# Patient Record
Sex: MALE | Race: WHITE | NOT HISPANIC OR LATINO | Employment: OTHER | URBAN - METROPOLITAN AREA
[De-identification: names, ages, dates, MRNs, and addresses within clinical notes are randomized per-mention and may not be internally consistent; named-entity substitution may affect disease eponyms.]

---

## 2020-08-03 ENCOUNTER — VIRTUAL VISIT (OUTPATIENT)
Dept: FAMILY MEDICINE | Facility: CLINIC | Age: 60
End: 2020-08-03
Payer: COMMERCIAL

## 2020-08-03 ENCOUNTER — TELEPHONE (OUTPATIENT)
Dept: FAMILY MEDICINE | Facility: CLINIC | Age: 60
End: 2020-08-03

## 2020-08-03 DIAGNOSIS — E03.9 HYPOTHYROIDISM, UNSPECIFIED TYPE: ICD-10-CM

## 2020-08-03 DIAGNOSIS — M10.9 ACUTE GOUT INVOLVING TOE OF LEFT FOOT, UNSPECIFIED CAUSE: Primary | ICD-10-CM

## 2020-08-03 PROCEDURE — 99203 OFFICE O/P NEW LOW 30 MIN: CPT | Mod: 95 | Performed by: NURSE PRACTITIONER

## 2020-08-03 RX ORDER — PREDNISONE 20 MG/1
TABLET ORAL
Qty: 15 TABLET | Refills: 0 | Status: SHIPPED | OUTPATIENT
Start: 2020-08-03 | End: 2020-08-13

## 2020-08-03 RX ORDER — ATORVASTATIN CALCIUM 20 MG/1
TABLET, FILM COATED ORAL DAILY
COMMUNITY
Start: 2020-06-22 | End: 2022-09-15

## 2020-08-03 RX ORDER — HYDROCHLOROTHIAZIDE 25 MG/1
25 TABLET ORAL DAILY
COMMUNITY
Start: 2020-01-07 | End: 2022-09-15

## 2020-08-03 RX ORDER — LOSARTAN POTASSIUM 100 MG/1
50 TABLET ORAL 2 TIMES DAILY
COMMUNITY
Start: 2020-07-07 | End: 2022-09-15

## 2020-08-03 RX ORDER — ALBUTEROL SULFATE 90 UG/1
AEROSOL, METERED RESPIRATORY (INHALATION)
COMMUNITY
Start: 2020-05-29 | End: 2022-09-15

## 2020-08-03 RX ORDER — LEVOTHYROXINE SODIUM 75 UG/1
75 TABLET ORAL
COMMUNITY
Start: 2020-01-07 | End: 2020-08-03

## 2020-08-03 RX ORDER — DEXAMETHASONE 4 MG/1
TABLET ORAL
COMMUNITY
Start: 2020-04-04 | End: 2022-09-15

## 2020-08-03 RX ORDER — LEVOTHYROXINE SODIUM 75 UG/1
75 TABLET ORAL DAILY
Qty: 90 TABLET | Refills: 0 | Status: SHIPPED | OUTPATIENT
Start: 2020-08-03 | End: 2020-11-24

## 2020-08-03 RX ORDER — FLUTICASONE PROPIONATE 50 MCG
SPRAY, SUSPENSION (ML) NASAL
COMMUNITY
Start: 2020-04-21

## 2020-08-03 NOTE — TELEPHONE ENCOUNTER
Pt was established with [facility] but insurance no longer covers them.  Pt thinks he is having a gout flare up and requesting possible meds.  Here is a new pt to [facility] and does not want to come into the clinic due to covid.    Scheduled telephone visit with Alex today at 2:20 pm.    Smita BRENNAN RN  EP Triage

## 2020-08-03 NOTE — PROGRESS NOTES
"Getachew Nicole is a 60 year old male who is being evaluated via a billable telephone visit.      The patient has been notified of following:     \"This telephone visit will be conducted via a call between you and your physician/provider. We have found that certain health care needs can be provided without the need for a physical exam.  This service lets us provide the care you need with a short phone conversation.  If a prescription is necessary we can send it directly to your pharmacy.  If lab work is needed we can place an order for that and you can then stop by our lab to have the test done at a later time.    Telephone visits are billed at different rates depending on your insurance coverage. During this emergency period, for some insurers they may be billed the same as an in-person visit.  Please reach out to your insurance provider with any questions.    If during the course of the call the physician/provider feels a telephone visit is not appropriate, you will not be charged for this service.\"    Patient has given verbal consent for Telephone visit?  Yes    What phone number would you like to be contacted at? [phone]    How would you like to obtain your AVS? Mail a copy    Subjective     Getachew Nicole is a 60 year old male who presents via phone visit today for the following health issues:      Gout/ single inflamed joint   Onset: yesterday     Description:   Location: big toe and foot - left  Joint Swelling: YES  Redness: YES  Pain: YES    Intensity: mild to moderate     Progression of Symptoms:  worsening    Accompanying Signs & Symptoms:  Fevers: no     History:   Trauma to the area: no   Previous history of gout: YES   Recent illness:  no     Precipitating factors:   Diet-rich in purine: no  Alcohol use: YES  Diuretic use: YES    Alleviating factors:  Elevation     Therapies Tried and outcome: historically prednisone    HPI: Getachew calls today with the concern of possible gout flare. He has had " one gout flare a couple a years ago (uric acid was checked and found to be elevated). Prednisone was used successfully during that episode. He notes that his symptoms today mimic exactly those that he was suffering from at that time. Specifically, he reports that his left big toe began to hurt yesterday. Soon after, it became red, warm, swollen, and tender. No fever, chills. No obvious skin compromise to causes suspicion for skin infection. No nail problems. He does have chronic kidney disease, so he doesn't use NSAIDs.     Patient Active Problem List   Diagnosis     Hypothyroidism     History reviewed. No pertinent surgical history.    Social History     Tobacco Use     Smoking status: Never Smoker     Smokeless tobacco: Never Used   Substance Use Topics     Alcohol use: Not on file     History reviewed. No pertinent family history.        Reviewed and updated as needed this visit by Provider  Tobacco  Allergies  Meds  Problems  Med Hx  Surg Hx  Fam Hx         Review of Systems   Constitutional, musculoskeletal, neuro, skin systems are negative, except as otherwise noted.       Objective   Reported vitals:  There were no vitals taken for this visit.   healthy, alert and no distress  PSYCH: Alert and oriented times 3; coherent speech, normal   rate and volume, able to articulate logical thoughts, able   to abstract reason, no tangential thoughts, no hallucinations   or delusions  His affect is normal  RESP: No cough, no audible wheezing, able to talk in full sentences  Remainder of exam unable to be completed due to telephone visits    Diagnostic Test Results:  Labs reviewed in Epic        Assessment/Plan:    1. Acute gout involving toe of left foot, unspecified cause  Comment: Would like to check uric acid and consider urate-lowering therapy, but he declines coming into the clinic at this time out of fear of janis COVID-19. Will try previously-successful prednisone course with the plan to have him come  for full fasting physical in the coming months at which time we can check all fasting labs and uric acid. He agrees with this approach. No red flags. Reinforced his need to minimize / avoid NSAID use.   - predniSONE (DELTASONE) 20 MG tablet; Take 40 mg (2 tablets) a day for 5 days followed by 20 mg (1 tablet) a day for 5 days.  Dispense: 15 tablet; Refill: 0    2. Hypothyroidism, unspecified type  Comment: Needs short fill to get him through until he can come for physical and labs.   - levothyroxine (SYNTHROID/LEVOTHROID) 75 MCG tablet; Take 1 tablet (75 mcg) by mouth daily  Dispense: 90 tablet; Refill: 0    Return in about 1 week (around 8/10/2020) for persistent or worsening symptoms.      Phone call duration:  16 minutes    Esa Ospina NP

## 2020-08-03 NOTE — TELEPHONE ENCOUNTER
General Call:   Who is calling:  Pt   Reason for Call:  requesting  the Nurse to call him back for advice, pt never establish care with us here, but he wants one of our nurse to call him for his left sore feet.  Pt advised to establish care first to get advice from our nurse but pt is refused and wants me to send the message  What are your questions or concerns:  NA  Date of last appointment with provider: NEW  Okay to leave a detailed message:Yes at Cell number on file:    Telephone Information:   Mobile [phone]

## 2020-08-13 ENCOUNTER — TELEPHONE (OUTPATIENT)
Dept: FAMILY MEDICINE | Facility: CLINIC | Age: 60
End: 2020-08-13

## 2020-08-13 DIAGNOSIS — M10.9 ACUTE GOUT INVOLVING TOE OF LEFT FOOT, UNSPECIFIED CAUSE: ICD-10-CM

## 2020-08-13 RX ORDER — PREDNISONE 20 MG/1
40 TABLET ORAL DAILY
Qty: 10 TABLET | Refills: 0 | Status: SHIPPED | OUTPATIENT
Start: 2020-08-13 | End: 2021-03-09

## 2020-08-13 RX ORDER — PREDNISONE 20 MG/1
40 TABLET ORAL DAILY
Qty: 10 TABLET | Refills: 0 | Status: SHIPPED | OUTPATIENT
Start: 2020-08-13 | End: 2020-08-13

## 2020-08-13 NOTE — TELEPHONE ENCOUNTER
Non detailed message left for pt to return call to clinic and ask to speak with a triage nurse.    Need to know if pt requested medication or auto refill?    Smita BRENNAN RN  EP Triage

## 2020-08-13 NOTE — TELEPHONE ENCOUNTER
Patient states that he is not pain free from gout yet. Pain decreased from 8.5 down to current level of 2.5. Patient is thinking 1 more prescription might get him to zero pain level. Patient finished prednisone prescription yesterday.    Please advise. Triage to call the patient back. OK to leave detailed message.   Abbi Scherer RN

## 2020-08-13 NOTE — TELEPHONE ENCOUNTER
[facility] calling from [facility] [place]. Informed that rx on current medication list for prednisone is 20 mg, 2 tablets by mouth daily for 5 days., # 10 tablets. Abbi Scherer RN

## 2020-08-13 NOTE — TELEPHONE ENCOUNTER
Please let him know that it isn't probably a good idea to repeat the whole ten day course starting at 60 mg (adrenal suppression, other side effects, etc.). I will order 5 days at 40 mg. Let's try that. Thanks.

## 2020-11-24 DIAGNOSIS — E03.9 HYPOTHYROIDISM, UNSPECIFIED TYPE: ICD-10-CM

## 2020-11-24 RX ORDER — LEVOTHYROXINE SODIUM 75 UG/1
75 TABLET ORAL DAILY
Qty: 30 TABLET | Refills: 0 | Status: SHIPPED | OUTPATIENT
Start: 2020-11-24 | End: 2022-09-15

## 2020-11-24 NOTE — LETTER
December 3, 2020      Getachew Nicole  [address] [place] [address]        Dear Getachew,    I care about your health and have reviewed your health plan. I have reviewed your medical conditions, medication list, and lab results and am making recommendations based on this review, to better manage your health.    You are in particular need of attention regarding:  -Wellness (Physical) Visit     I am recommending that you:  -Schedule an appointment for a fasting physical    Here is a list of Health Maintenance topics that are due now or due soon:  Health Maintenance Due   Topic Date Due     Preventive Care Visit  1960     Discuss Advance Care Planning  1960     Colorectal Cancer Screening  03/12/1970     HIV Screening  03/12/1975     Hepatitis C Screening  03/12/1978     Cholesterol Lab  03/12/1995     PHQ-2  01/01/2020     Flu Vaccine (1) 09/01/2020     Zoster (Shingles) Vaccine (2 of 2) 03/03/2020       Please call us at [phone] (or use [email or web address]) to address the above recommendations.     Thank you for trusting [facility] and we appreciate the opportunity to serve you.  We look forward to supporting your healthcare needs in the future.    Healthy Regards,    Esa Ospina, APRN, CNP

## 2020-12-03 NOTE — TELEPHONE ENCOUNTER
Letter sent.    .Genesis QUIROZ    [facility] [place]

## 2021-03-08 DIAGNOSIS — M10.9 ACUTE GOUT INVOLVING TOE OF LEFT FOOT, UNSPECIFIED CAUSE: ICD-10-CM

## 2021-03-08 NOTE — LETTER
March 15, 2021      Getachew Nicole  [address]  [place] [address]        We have received a refill request for Prednisone, this was to be a one time prescription.  It has been refilled this time, but for any further refills of this medication you will need to have an office visit.  Please follow up in clinic when able. You are also due for thyroid and blood pressure check           Sincerely,      Rachana Hines [facility]

## 2021-03-09 RX ORDER — PREDNISONE 20 MG/1
40 TABLET ORAL DAILY
Qty: 10 TABLET | Refills: 0 | OUTPATIENT
Start: 2021-03-09

## 2021-03-09 RX ORDER — PREDNISONE 20 MG/1
40 TABLET ORAL DAILY
Qty: 10 TABLET | Refills: 0 | Status: SHIPPED | OUTPATIENT
Start: 2021-03-09

## 2021-03-09 NOTE — TELEPHONE ENCOUNTER
Please see if he requested this or if it is on auto-refill at his pharmacy. If he is requesting it for a presumed gout flare, will need visit (virtual or in-person). Thanks.

## 2021-03-09 NOTE — TELEPHONE ENCOUNTER
States he has no insurance right now and is hoping to get a precprition with out a visit.  Last virtual visit costed him over $500.  States he has had a gout flare since last week. Advised I would forward the message to provider and follow up.  Clem MCKEON, CMA

## 2021-03-09 NOTE — TELEPHONE ENCOUNTER
Failed protocol.  please route to  team if patient needs an appointment     Sally SIMONRN BSN  [facility]  [phone]

## 2021-03-09 NOTE — TELEPHONE ENCOUNTER
I sent this as a one-time Rx. Will be unable to do this in the future without clinical encounter. Please follow up in clinic when able. Looks like he is due for thyroid and BP checks, as well.     Thanks,    Alex

## 2021-03-18 DIAGNOSIS — E03.9 HYPOTHYROIDISM, UNSPECIFIED TYPE: ICD-10-CM

## 2021-03-18 RX ORDER — LEVOTHYROXINE SODIUM 75 UG/1
75 TABLET ORAL DAILY
Qty: 30 TABLET | Refills: 0 | OUTPATIENT
Start: 2021-03-18

## 2021-03-18 NOTE — TELEPHONE ENCOUNTER
Letter was mailed to pt 3/15/21. Pharmacy advised to add note for pt that appointment is needed. Clem MCKEON CMA

## 2021-03-18 NOTE — TELEPHONE ENCOUNTER
"Failed protocol.  please route to  team if patient needs an appointment     Sally SIMONRN BSN  [facility]  [phone]    Requested Prescriptions   Pending Prescriptions Disp Refills     levothyroxine (SYNTHROID/LEVOTHROID) 75 MCG tablet 30 tablet 0     Sig: Take 1 tablet (75 mcg) by mouth daily       Thyroid Protocol Failed - 3/18/2021  9:59 AM        Failed - Normal TSH on file in past 12 months     No lab results found.           Passed - Patient is 12 years or older        Passed - Recent (12 mo) or future (30 days) visit within the authorizing provider's specialty     Patient has had an office visit with the authorizing provider or a provider within the authorizing providers department within the previous 12 mos or has a future within next 30 days. See \"Patient Info\" tab in inbasket, or \"Choose Columns\" in Meds & Orders section of the refill encounter.              Passed - Medication is active on med list             "

## 2021-04-14 ENCOUNTER — TELEPHONE (OUTPATIENT)
Dept: FAMILY MEDICINE | Facility: CLINIC | Age: 61
End: 2021-04-14

## 2021-04-14 NOTE — LETTER
[facility]  [address]  [place] [address]  Phone: [phone]      Getachew Nicole  [address]  [place] [address]      Greetings Getachew,    I am going through our patient lists here at the [facility] to find potential gaps in our patients' recommended preventive health care plans. Your name appeared on a list of patients who may be overdue for colorectal cancer screening. The most common / popular options for screening include: Colonoscopy (performed every 10 years if normal) or FIT test (a stool test performed annually). Please consider reaching out to us, so we can order one of these studies to make sure you can participate in this lifesaving preventive health screening. It is possible that you may have received this screening at an outside facility / health system, which is great. If that is the case, please send us the following information, so we can update your records and our patient lists:     1. Approximate date (month and year)  2. Health system / clinic / facility  3. General results (normal verus abnormal)    Thank you very much for your time and attention.    Have a great day,    ISAIAS Carlin, CNP

## 2021-08-25 ENCOUNTER — LAB REQUISITION (OUTPATIENT)
Dept: LAB | Facility: CLINIC | Age: 61
End: 2021-08-25
Payer: COMMERCIAL

## 2021-08-25 DIAGNOSIS — E03.9 HYPOTHYROIDISM, UNSPECIFIED: ICD-10-CM

## 2021-08-25 DIAGNOSIS — N18.31 CHRONIC KIDNEY DISEASE, STAGE 3A (H): ICD-10-CM

## 2021-08-25 LAB
CREAT UR-MCNC: 93 MG/DL
PROT UR-MCNC: 0.19 G/L
PROT/CREAT 24H UR: 0.2 G/G CR (ref 0–0.2)
PTH-INTACT SERPL-MCNC: 104 PG/ML (ref 18–80)
TOTAL PROTEIN SERUM FOR ELP: 7.4 G/DL (ref 6.8–8.8)
TSH SERPL DL<=0.005 MIU/L-ACNC: 3.1 MU/L (ref 0.4–4)

## 2021-08-25 PROCEDURE — 84165 PROTEIN E-PHORESIS SERUM: CPT | Mod: ORL | Performed by: PATHOLOGY

## 2021-08-25 PROCEDURE — 84165 PROTEIN E-PHORESIS SERUM: CPT | Mod: 26 | Performed by: PATHOLOGY

## 2021-08-25 PROCEDURE — 84166 PROTEIN E-PHORESIS/URINE/CSF: CPT | Mod: TC,ORL | Performed by: PATHOLOGY

## 2021-08-25 PROCEDURE — 84443 ASSAY THYROID STIM HORMONE: CPT | Mod: ORL | Performed by: INTERNAL MEDICINE

## 2021-08-25 PROCEDURE — 84155 ASSAY OF PROTEIN SERUM: CPT | Mod: ORL | Performed by: INTERNAL MEDICINE

## 2021-08-25 PROCEDURE — 84166 PROTEIN E-PHORESIS/URINE/CSF: CPT | Mod: 26 | Performed by: PATHOLOGY

## 2021-08-25 PROCEDURE — 83970 ASSAY OF PARATHORMONE: CPT | Mod: ORL | Performed by: INTERNAL MEDICINE

## 2021-08-25 PROCEDURE — 84156 ASSAY OF PROTEIN URINE: CPT | Mod: ORL | Performed by: INTERNAL MEDICINE

## 2021-08-26 LAB
ALBUMIN MFR UR ELPH: 49.1 %
ALPHA1 GLOB MFR UR ELPH: 9.1 %
ALPHA2 GLOB MFR UR ELPH: 9.6 %
B-GLOBULIN MFR UR ELPH: 13.2 %
GAMMA GLOB MFR UR ELPH: 19 %
M PROTEIN MFR UR ELPH: 0 %
PROT PATTERN UR ELPH-IMP: ABNORMAL

## 2021-08-27 LAB
ALBUMIN SERPL ELPH-MCNC: 4.1 G/DL (ref 3.7–5.1)
ALPHA1 GLOB SERPL ELPH-MCNC: 0.3 G/DL (ref 0.2–0.4)
ALPHA2 GLOB SERPL ELPH-MCNC: 1 G/DL (ref 0.5–0.9)
B-GLOBULIN SERPL ELPH-MCNC: 0.7 G/DL (ref 0.6–1)
GAMMA GLOB SERPL ELPH-MCNC: 1.2 G/DL (ref 0.7–1.6)
M PROTEIN SERPL ELPH-MCNC: 0 G/DL
PROT PATTERN SERPL ELPH-IMP: ABNORMAL

## 2021-12-22 ENCOUNTER — TELEPHONE (OUTPATIENT)
Dept: FAMILY MEDICINE | Facility: CLINIC | Age: 61
End: 2021-12-22
Payer: COMMERCIAL

## 2021-12-22 NOTE — TELEPHONE ENCOUNTER
Patient called asking if [facility] is testing for the [employer] Variant. Informed the patient that at this moment in time [facility] is not. Advised the patient to check with [facility] web site for the most up to date information.    Patient states that he has been having a runny nose for 4-6 weeks. Advised the patient to do an E visit.     Abbi Scherer RN

## 2022-07-26 ENCOUNTER — TRANSFERRED RECORDS (OUTPATIENT)
Dept: HEALTH INFORMATION MANAGEMENT | Facility: CLINIC | Age: 62
End: 2022-07-26

## 2022-08-09 ENCOUNTER — TELEPHONE (OUTPATIENT)
Dept: CARDIOLOGY | Facility: CLINIC | Age: 62
End: 2022-08-09

## 2022-08-09 DIAGNOSIS — R00.2 PALPITATIONS: Primary | ICD-10-CM

## 2022-08-09 NOTE — TELEPHONE ENCOUNTER
----- Message from Solange Mercado RN sent at 8/9/2022  2:33 PM CDT -----  Regarding: Not urgent- please request records from [facility] 9/7 for abnormal HR; ED visit/OV? Labs? Testing?

## 2022-08-09 NOTE — TELEPHONE ENCOUNTER
[facility] called for records.  Only seen there once in ED on 7-26-22 for A Flutter,   Clinic will fax ED note, EKG tracing, and labs.   Records will be faxed.     [facility] records ph# [phone]  Fax # [phone] or [phone].

## 2022-08-18 NOTE — TELEPHONE ENCOUNTER
Routing refill request to provider for review/approval because:  Labs not current:  NO TSH on file    Smita BRENNAN RN  EP Triage           None known

## 2022-08-27 ENCOUNTER — NURSE TRIAGE (OUTPATIENT)
Dept: NURSING | Facility: CLINIC | Age: 62
End: 2022-08-27
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
## 2022-09-02 NOTE — TELEPHONE ENCOUNTER
Called [facility] medical records again for ED visit notes, said the sent it on 8/16 but discussed that we did not receive it. They will re-send it before the end of the day.

## 2022-09-15 ENCOUNTER — OFFICE VISIT (OUTPATIENT)
Dept: CARDIOLOGY | Facility: CLINIC | Age: 62
End: 2022-09-15
Payer: COMMERCIAL

## 2022-09-15 VITALS
HEIGHT: 70 IN | HEART RATE: 74 BPM | SYSTOLIC BLOOD PRESSURE: 149 MMHG | BODY MASS INDEX: 25.09 KG/M2 | DIASTOLIC BLOOD PRESSURE: 63 MMHG | WEIGHT: 175.3 LBS

## 2022-09-15 DIAGNOSIS — I10 PRIMARY HYPERTENSION: ICD-10-CM

## 2022-09-15 DIAGNOSIS — N18.30 STAGE 3 CHRONIC KIDNEY DISEASE, UNSPECIFIED WHETHER STAGE 3A OR 3B CKD (H): ICD-10-CM

## 2022-09-15 DIAGNOSIS — E03.9 HYPOTHYROIDISM, UNSPECIFIED TYPE: ICD-10-CM

## 2022-09-15 DIAGNOSIS — I48.92 ATRIAL FLUTTER, UNSPECIFIED TYPE (H): ICD-10-CM

## 2022-09-15 DIAGNOSIS — R00.2 PALPITATIONS: Primary | ICD-10-CM

## 2022-09-15 PROCEDURE — 93000 ELECTROCARDIOGRAM COMPLETE: CPT | Performed by: INTERNAL MEDICINE

## 2022-09-15 PROCEDURE — 99204 OFFICE O/P NEW MOD 45 MIN: CPT | Performed by: INTERNAL MEDICINE

## 2022-09-15 RX ORDER — MONTELUKAST SODIUM 10 MG/1
10 TABLET ORAL AT BEDTIME
Qty: 90 TABLET | Refills: 4 | Status: SHIPPED | OUTPATIENT
Start: 2022-09-15

## 2022-09-15 RX ORDER — LOSARTAN POTASSIUM 100 MG/1
50 TABLET ORAL 2 TIMES DAILY
Qty: 90 TABLET | Refills: 4 | Status: SHIPPED | OUTPATIENT
Start: 2022-09-15

## 2022-09-15 RX ORDER — ALLOPURINOL 100 MG/1
100 TABLET ORAL DAILY
Qty: 90 TABLET | Refills: 4 | Status: SHIPPED | OUTPATIENT
Start: 2022-09-15

## 2022-09-15 RX ORDER — METOPROLOL SUCCINATE 100 MG/1
100 TABLET, EXTENDED RELEASE ORAL DAILY
Qty: 90 TABLET | Refills: 4 | Status: SHIPPED | OUTPATIENT
Start: 2022-09-15

## 2022-09-15 RX ORDER — METOPROLOL SUCCINATE 100 MG/1
100 TABLET, EXTENDED RELEASE ORAL DAILY
COMMUNITY
Start: 2022-07-27 | End: 2022-09-15

## 2022-09-15 RX ORDER — MONTELUKAST SODIUM 10 MG/1
10 TABLET ORAL AT BEDTIME
COMMUNITY
End: 2022-09-15

## 2022-09-15 RX ORDER — ATORVASTATIN CALCIUM 20 MG/1
20 TABLET, FILM COATED ORAL DAILY
Qty: 90 TABLET | Refills: 4 | Status: SHIPPED | OUTPATIENT
Start: 2022-09-15

## 2022-09-15 RX ORDER — ALBUTEROL SULFATE 90 UG/1
AEROSOL, METERED RESPIRATORY (INHALATION)
Qty: 18 G | Refills: 4 | Status: SHIPPED | OUTPATIENT
Start: 2022-09-15

## 2022-09-15 RX ORDER — DEXAMETHASONE 4 MG/1
TABLET ORAL
Qty: 0.11 G | Refills: 4 | Status: SHIPPED | OUTPATIENT
Start: 2022-09-15

## 2022-09-15 RX ORDER — ALLOPURINOL 100 MG/1
100 TABLET ORAL DAILY
COMMUNITY
End: 2022-09-15

## 2022-09-15 RX ORDER — HYDROCHLOROTHIAZIDE 25 MG/1
25 TABLET ORAL DAILY
Qty: 90 TABLET | Refills: 4 | Status: SHIPPED | OUTPATIENT
Start: 2022-09-15

## 2022-09-15 RX ORDER — LEVOTHYROXINE SODIUM 75 UG/1
75 TABLET ORAL DAILY
Qty: 90 TABLET | Refills: 4 | Status: SHIPPED | OUTPATIENT
Start: 2022-09-15

## 2022-09-15 NOTE — PROGRESS NOTES
Service Date: 09/15/2022    REFERRING PROVIDER:  Not listed.    HISTORY OF PRESENT ILLNESS:  Mr. Nicole is a pleasant 62-year-old male who was seen in [facility] at the end of July for an episode of syncope.  He was at [employer] and had been sitting in a hot tub.  He went to go get in the pool.  He felt a little lightheaded, so he sat down for a minute.  When he went to get up, he fainted, hit his head and had a head laceration.  He was taken to the Emergency Department, and he was found to be in a supraventricular tachycardia, heart rate initially in the 250s.  He converted into an atrial flutter and then ultimately was cardioverted in the ED.  He was placed on Eliquis in addition to his antihypertensives, which include metoprolol 100 mg daily.  He did not start the Eliquis but talked to a friend who is a physician, who advised him to take 2 baby aspirin every night.  He was concerned that he could not take the Eliquis with his kidney dysfunction.  He told me that he had been off his metoprolol for several days because he ran out prior to this event.  He has never had any rhythm disorders.  He denies palpitations.  He swims every day at [employer] and has no exercise intolerance.  Denies any chest pain or difficulty breathing.  We did perform an electrocardiogram in office today.  This demonstrates a sinus rhythm with a slight interventricular conduction delay.  He has ST segment depressions in the lateral leads with T-wave inversions.  This could represent ischemia and/or left ventricular hypertrophy.  He does have chronic kidney disease and uncontrolled hypertension.    PHYSICAL EXAMINATION:  NECK:  Carotid upstrokes are brisk without bruit.  CARDIOVASCULAR:  Tones are regular.  I did not appreciate a murmur, gallop or rub.  RESPIRATORY:  Lung fields are clear.  EXTREMITIES:  He has no peripheral edema.    ASSESSMENT AND PLAN:  In summary, Mr. Nicole is a pleasant 62-year-old male with an  episode of syncope, SVT and then converted to atrial flutter.  He received a cardioversion in the ED and was recommended anticoagulation.  However, he did not start this for fear about his kidney dysfunction.  It is unclear whether this may have been related to rebound effect from sudden cessation of his beta blocker.  I would like him to wear a heart monitor to determine if he has evidence of atrial fibrillation or flutter before making the decision and committing him to long-term anticoagulation.  He is very concerned about continuing to be able to swim on a daily basis.  It is very important.  Apparently, he has got underlying general tendinitis and relies heavily on swimming for pain management, so I ordered a cardiac event monitor that can be removed for swimming.  I would also like him to undergo an echocardiogram because of the EKG changes to look for any LV dysfunction, LVH or structural heart disease.  I did renew all of his medications for him so that he does not run out again urged him to follow up with his primary in this regard as well.  He should take his blood pressure on a daily basis for a while and record the measurements and bring them back with him.  We do need to get his blood pressure under better management.  He does have significant kidney dysfunction, and I do see a note from his primary urging him to follow up on his kidney tests, which it does not appear he has completed, and I did talk to him today that he definitely appears to have at least stage III chronic kidney disease that needs further evaluation.  We will follow up with the above cardiac testing.    Please feel free to contact me with any questions you have in regards to his care.    Eun Beaulieu DO        D: 09/15/2022   T: 09/15/2022   MT: devin    Name:     ROBERT JOSE  MRN:      [id number]        Account:      [id number]   :      1960           Service Date: 09/15/2022       Document: [id number]

## 2022-09-15 NOTE — LETTER
9/15/2022    Physician No Ref-Primary  No address on file    RE: Getachew Nicole       Dear Colleague,     I had the pleasure of seeing Getachew Nicole in the [facility].  HPI and Plan:   See dictation    Orders Placed This Encounter   Procedures     EKG 12-lead complete w/read - Clinics (performed today)     Adult Cardiac Event Monitor     Echocardiogram Complete       Orders Placed This Encounter   Medications     DISCONTD: metoprolol succinate ER (TOPROL XL) 100 MG 24 hr tablet     Sig: Take 100 mg by mouth daily     DISCONTD: allopurinol (ZYLOPRIM) 100 MG tablet     Sig: Take 100 mg by mouth daily     DISCONTD: montelukast (SINGULAIR) 10 MG tablet     Sig: Take 10 mg by mouth At Bedtime     aspirin (ASA) 325 MG EC tablet     Sig: Take 650 mg by mouth daily     levothyroxine (SYNTHROID/LEVOTHROID) 75 MCG tablet     Sig: Take 1 tablet (75 mcg) by mouth daily     Dispense:  90 tablet     Refill:  4     albuterol (PROAIR HFA/PROVENTIL HFA/VENTOLIN HFA) 108 (90 Base) MCG/ACT inhaler     Sig: INHALE 2 PUFFS EVERY 4 HOURS AS NEEDED FOR WHEEZING. ALSO USE 15 30 MIN PRIOR TO EXERCISE     Dispense:  18 g     Refill:  4     Pharmacy may dispense brand covered by insurance (Proair, or proventil or ventolin or generic albuterol inhaler)     allopurinol (ZYLOPRIM) 100 MG tablet     Sig: Take 1 tablet (100 mg) by mouth daily     Dispense:  90 tablet     Refill:  4     atorvastatin (LIPITOR) 20 MG tablet     Sig: Take 1 tablet (20 mg) by mouth daily     Dispense:  90 tablet     Refill:  4     FLOVENT  MCG/ACT inhaler     Sig: INHALE 2 PUFFS TWICE A DAY AS DIRECTED     Dispense:  0.11 g     Refill:  4     Pharmacy may dispense brand if preferred by insurance.     hydrochlorothiazide (HYDRODIURIL) 25 MG tablet     Sig: Take 1 tablet (25 mg) by mouth daily     Dispense:  90 tablet     Refill:  4     losartan (COZAAR) 100 MG tablet     Sig: Take 0.5 tablets (50 mg) by mouth 2 times daily     Dispense:   90 tablet     Refill:  4     metoprolol succinate ER (TOPROL XL) 100 MG 24 hr tablet     Sig: Take 1 tablet (100 mg) by mouth daily     Dispense:  90 tablet     Refill:  4     montelukast (SINGULAIR) 10 MG tablet     Sig: Take 1 tablet (10 mg) by mouth At Bedtime     Dispense:  90 tablet     Refill:  4       Medications Discontinued During This Encounter   Medication Reason     albuterol (PROAIR HFA/PROVENTIL HFA/VENTOLIN HFA) 108 (90 Base) MCG/ACT inhaler Reorder     atorvastatin (LIPITOR) 20 MG tablet Reorder     FLOVENT  MCG/ACT inhaler Reorder     hydrochlorothiazide (HYDRODIURIL) 25 MG tablet Reorder     losartan (COZAAR) 100 MG tablet Reorder     levothyroxine (SYNTHROID/LEVOTHROID) 75 MCG tablet Reorder     metoprolol succinate ER (TOPROL XL) 100 MG 24 hr tablet Reorder     allopurinol (ZYLOPRIM) 100 MG tablet Reorder     montelukast (SINGULAIR) 10 MG tablet Reorder         Encounter Diagnoses   Name Primary?     Palpitations Yes     Atrial flutter, unspecified type (H)      Stage 3 chronic kidney disease, unspecified whether stage 3a or 3b CKD (H)      Primary hypertension      Hypothyroidism, unspecified type        CURRENT MEDICATIONS:  Current Outpatient Medications   Medication Sig Dispense Refill     albuterol (PROAIR HFA/PROVENTIL HFA/VENTOLIN HFA) 108 (90 Base) MCG/ACT inhaler INHALE 2 PUFFS EVERY 4 HOURS AS NEEDED FOR WHEEZING. ALSO USE 15 30 MIN PRIOR TO EXERCISE 18 g 4     allopurinol (ZYLOPRIM) 100 MG tablet Take 1 tablet (100 mg) by mouth daily 90 tablet 4     aspirin (ASA) 325 MG EC tablet Take 650 mg by mouth daily       atorvastatin (LIPITOR) 20 MG tablet Take 1 tablet (20 mg) by mouth daily 90 tablet 4     FLOVENT  MCG/ACT inhaler INHALE 2 PUFFS TWICE A DAY AS DIRECTED 0.11 g 4     fluticasone (FLONASE) 50 MCG/ACT nasal spray        hydrochlorothiazide (HYDRODIURIL) 25 MG tablet Take 1 tablet (25 mg) by mouth daily 90 tablet 4     levothyroxine (SYNTHROID/LEVOTHROID) 75 MCG  "tablet Take 1 tablet (75 mcg) by mouth daily 90 tablet 4     losartan (COZAAR) 100 MG tablet Take 0.5 tablets (50 mg) by mouth 2 times daily 90 tablet 4     metoprolol succinate ER (TOPROL XL) 100 MG 24 hr tablet Take 1 tablet (100 mg) by mouth daily 90 tablet 4     montelukast (SINGULAIR) 10 MG tablet Take 1 tablet (10 mg) by mouth At Bedtime 90 tablet 4     predniSONE (DELTASONE) 20 MG tablet Take 2 tablets (40 mg) by mouth daily (Patient not taking: Reported on 9/15/2022) 10 tablet 0       ALLERGIES     Allergies   Allergen Reactions     Lisinopril        PAST MEDICAL HISTORY:  History reviewed. No pertinent past medical history.    PAST SURGICAL HISTORY:  History reviewed. No pertinent surgical history.    FAMILY HISTORY:  Family History   Problem Relation Age of Onset     Hypertension Mother      Cerebral aneurysm Mother      Multiple myeloma Father      Hypertension Brother      Hypertension Brother      Hypertension Sister      Hypertension Sister      Hypertension Sister        SOCIAL HISTORY:  Social History     Socioeconomic History     Marital status:      Spouse name: None     Number of children: None     Years of education: None     Highest education level: None   Tobacco Use     Smoking status: Never Smoker     Smokeless tobacco: Never Used   Substance and Sexual Activity     Alcohol use: Not Currently       Review of Systems:  Skin:          Eyes:         ENT:         Respiratory:  Positive for   states has allergy related cough   Cardiovascular:  Negative;chest pain;lightheadedness;dizziness;syncope or near-syncope;cyanosis;fatigue;edema;exercise intolerance Positive for swims one hour daily, tolerates well  Gastroenterology:        Genitourinary:         Musculoskeletal:         Neurologic:         Psychiatric:         Heme/Lymph/Imm:  Negative      Endocrine:  Positive for thyroid disorder      Physical Exam:  Vitals: BP (!) 149/63   Pulse 74   Ht 1.778 m (5' 10\")   Wt 79.5 kg (175 lb 4.8 " oz)   BMI 25.15 kg/m      Constitutional:  cooperative;in no acute distress        Skin:  warm and dry to the touch          Head:  normocephalic        Eyes:  pupils equal and round        Lymph:      ENT:  no pallor or cyanosis        Neck:  no carotid bruit        Respiratory:  clear to auscultation;normal symmetry         Cardiac: regular rhythm;no murmurs, gallops or rubs detected                pulses full and equal                                        GI:  abdomen soft;no bruits        Extremities and Muscular Skeletal:  no deformities, clubbing, cyanosis, erythema observed;no edema              Neurological:  no gross motor deficits;affect appropriate        Psych:  Alert and Oriented x 3          CC  No referring provider defined for this encounter.                      Thank you for allowing me to participate in the care of your patient.      Sincerely,     Eun Beaulieu DO     [facility]  cc:   No referring provider defined for this encounter.

## 2022-09-15 NOTE — LETTER
Date:September 16, 2022      Provider requested that no letter be sent. Do not send.       [facility]

## 2022-09-22 DIAGNOSIS — N18.30 STAGE 3 CHRONIC KIDNEY DISEASE, UNSPECIFIED WHETHER STAGE 3A OR 3B CKD (H): ICD-10-CM

## 2022-09-22 DIAGNOSIS — E03.9 HYPOTHYROIDISM, UNSPECIFIED TYPE: ICD-10-CM

## 2022-09-22 DIAGNOSIS — R00.2 PALPITATIONS: ICD-10-CM

## 2022-09-22 DIAGNOSIS — I10 PRIMARY HYPERTENSION: ICD-10-CM

## 2022-09-22 DIAGNOSIS — I48.92 ATRIAL FLUTTER, UNSPECIFIED TYPE (H): ICD-10-CM

## 2022-09-22 RX ORDER — LOSARTAN POTASSIUM 100 MG/1
50 TABLET ORAL 2 TIMES DAILY
Qty: 90 TABLET | Refills: 4 | Status: CANCELLED | OUTPATIENT
Start: 2022-09-22

## 2022-09-22 NOTE — TELEPHONE ENCOUNTER
[facility] Refill Guideline reviewed.  Medication does not meet criteria for refill due to pahramcy is requesting a different dose of Losartan.  Messaged to providers team for further review.

## 2022-09-29 ENCOUNTER — TELEPHONE (OUTPATIENT)
Dept: CARDIOLOGY | Facility: CLINIC | Age: 62
End: 2022-09-29

## 2022-09-29 NOTE — TELEPHONE ENCOUNTER
[facility] Call Center    Phone Message    May a detailed message be left on voicemail: yes     Reason for Call: Other: Narciso called he has questions about the blood thinner that he is on. Please call patient to discuss.      Action Taken: Other: cardiology    Travel Screening: Not Applicable   Thank you!  [facility]

## 2022-09-30 NOTE — TELEPHONE ENCOUNTER
Patient is wondering if he needs to continue taking Aspirin.  Discussed with patient his recent cardioversion for atrial flutter, patient is awaiting his heart monitor.  Advised that he should continue as he and Dr. Beaulieu discussed until results of heart monitor are back.  Patient verbalized understanding and has no other questions at this time.  Glory Phan RN on 9/30/2022 at 5:00 PM

## 2022-10-07 ENCOUNTER — TELEPHONE (OUTPATIENT)
Dept: CARDIOLOGY | Facility: CLINIC | Age: 62
End: 2022-10-07

## 2022-10-07 DIAGNOSIS — I48.92 ATRIAL FLUTTER, UNSPECIFIED TYPE (H): Primary | ICD-10-CM

## 2022-10-07 NOTE — TELEPHONE ENCOUNTER
Message from Dr. Arce:  Ed Arce MD  [facility] Team 2; Eun Beaulieu, DO  I know Narciso from [employer] and I take care of two of his sisters ([employer]). He is a functional alcoholic. Is now  because of it. I received a call from his family to see if I could see him as the family knows me. I see that Tania Beaulieu saw him recently in consultation after a recent ER visit. I saw him a couple of times pre electronic MR. I suspect he may not follow up on the eval ordered by Tania. If OK with Carmen, I can see him in follow up next week as I gave some days back next week. If he hasn't followed up with his work up I can see him down the road. Thanks     Patient is scheduled to have an echo and start a 10 day event monitor on 10/18/2022.    Will message scheduling team to review options and contact the patient.  Order placed for visit with Dr. Arce

## 2022-10-14 ENCOUNTER — TELEPHONE (OUTPATIENT)
Dept: CARDIOLOGY | Facility: CLINIC | Age: 62
End: 2022-10-14

## 2022-10-14 NOTE — TELEPHONE ENCOUNTER
Message from scheduling team:   10/10/22 Lvm to schedule with Dr Arce - NP Slot on hold 11/2 at 11:45am [place].     Attempted to contact patient to confirm date offered by Dr. Arce and to add him to the schedule. Left message for patient to call back.  Patient has testing scheduled on 10/18/2022.

## 2022-10-17 NOTE — TELEPHONE ENCOUNTER
Patient moved testing (echo and 10 day event monitor) to 11/3/2022.  Will ask scheduling to contact patient to offer OV with Dr. Arce on 11/23/2022.

## 2022-10-25 ENCOUNTER — DOCUMENTATION ONLY (OUTPATIENT)
Dept: CARDIOLOGY | Facility: CLINIC | Age: 62
End: 2022-10-25

## 2022-10-25 NOTE — PROGRESS NOTES
Multiple attempts made by scheduling team to arrange follow up with Dr Arce, no return call from patient. Letter sent.

## 2022-10-25 NOTE — LETTER
October 25, 2022       TO: Getachew Nicole  [address] [place] [address]       Dear Getachew Nicole,    We have been trying to reach you by phone to set up a new patient visit with Dr Arce to follow up on your test results. Please call our scheduling department at [phone] to set up your appointment.     Thank you,    [facility]

## 2022-10-26 ENCOUNTER — DOCUMENTATION ONLY (OUTPATIENT)
Dept: CARDIOLOGY | Facility: CLINIC | Age: 62
End: 2022-10-26

## 2022-10-26 NOTE — PROGRESS NOTES
Spoke with Dr. Arce to approve adding the patient to the [place] schedule on 12/1/2022 @ 10:30 as an add-on. Scheduling will send the patient a letter with the appointment date.

## 2022-12-28 ENCOUNTER — NURSE TRIAGE (OUTPATIENT)
Dept: FAMILY MEDICINE | Facility: CLINIC | Age: 62
End: 2022-12-28

## 2022-12-28 NOTE — TELEPHONE ENCOUNTER
"Nurse Triage SBAR    Is this a 2nd Level Triage? NO    Situation: Pt experiencing mild swelling to bilateral feet and ankles but it is worse on the right side. He first started noticing this yesterday.    Background: Denies pain and is unsure what is causing this. He denies fever, chest pain, difficulty breathing, and calf pain. He also notes when he takes his socks off he does notice a indent/line which does go away after about 10-15 minutes.    Assessment: see below    Protocol Recommended Disposition:   Go To Office Now    Recommendation: protocol states to go to office now. Since clinic closes in 5 minutes to not delay nurse advised UC (protocol states DVT may need to be ruled out). Pt declined UC and states he wants a visit in the clinic. Please review/advise if same day or other slot can be used to schedule him.    Routed to provider    Does the patient meet one of the following criteria for ADS visit consideration? No   Pt used to see Alex Ospina, has not been seen at [facility] since 2020.    Ok to leave detailed vm.     Reason for Disposition    Thigh, calf, or ankle swelling and bilateral and 1 side is more swollen    Additional Information    Negative: Chest pain    Negative: Followed an ankle injury    Negative: Followed a bee sting and has localized swelling (e.g., small area of puffy or swollen skin)    Negative: Followed an insect bite and has localized swelling (e.g., small area of puffy or swollen skin)    Negative: Ankle pain is main symptom    Negative: Swelling of both ankles (i.e., pedal edema)    Negative: Swelling of calf or leg is main symptom    Negative: Ankle pain and fever    Negative: Ankle redness and fever    Negative: Patient sounds very sick or weak to the triager    Negative: Difficulty breathing    Negative: Entire foot is cool or blue in comparison to other side    Answer Assessment - Initial Assessment Questions  1. LOCATION: \"Which ankle is swollen?\" \"Where is the swelling?\"        Both " "feet and ankles however, more so on the right.     2. ONSET: \"When did the swelling start?\"        Started noticing it yesterday    3. SWELLING: \"How bad is the swelling?\" Or, \"How large is it?\" (e.g., mild, moderate, severe; size of localized swelling)     - NONE: No joint swelling.    - LOCALIZED: Localized; small area of puffy or swollen skin (e.g., insect bite, skin irritation).    - MILD: Joint looks or feels mildly swollen or puffy.    - MODERATE: Swollen; interferes with normal activities (e.g., work or school); decreased range of movement; may be limping.    - SEVERE: Very swollen; can't move swollen joint at all; limping a lot or unable to walk.        Mild    4. PAIN: \"Is there any pain?\" If Yes, ask: \"How bad is it?\" (Scale 1-10; or mild, moderate, severe)    - NONE (0): no pain.    - MILD (1-3): doesn't interfere with normal activities.     - MODERATE (4-7): interferes with normal activities (e.g., work or school) or awakens from sleep, limping.     - SEVERE (8-10): excruciating pain, unable to do any normal activities, unable to walk.         No    5. CAUSE: \"What do you think caused the ankle swelling?\"        Unsure    6. OTHER SYMPTOMS: \"Do you have any other symptoms?\" (e.g., fever, chest pain, difficulty breathing, calf pain)        No. He does note when he takes his socks off he ddi notice a line but this went away after 10-15 minutes.    7. PREGNANCY: \"Is there any chance you are pregnant?\" \"When was your last menstrual period?\"        NA    Protocols used: ANKLE SWELLING-A-OH    GO TO OFFICE NOW:  * You need to be examined. Come into the office right now.   * IF NO AVAILABLE APPOINTMENTS: You need to be seen in an Urgent Care Center. Leave now. A nearby Urgent Care Center is often a good source of care. Another choice is to go to the Emergency Department.      CALL BACK IF:  * You develop a fever  * Ankle becomes red or very painful  * Ankle becomes very swollen  * You become worse    "     Patient/Caregiver understands and will follow care advice? Other, see documentation     Selena JUAREZ RN  [facility]

## 2022-12-30 NOTE — TELEPHONE ENCOUNTER
Provider Recommendation Follow Up:   Unable to reach patient/caregiver. Left detailed message with information from Dr. Pettit, see below. Also left clinic number for any further questions or concerns.    Selena JUAREZ RN  [facility]

## 2022-12-30 NOTE — TELEPHONE ENCOUNTER
Seems like message was 2-day old if patient still experiencing that and one-sided he needs to go to the urgent cget it checked.  I do not think so we have any opening in the clinic today.